# Patient Record
Sex: MALE | Race: WHITE | NOT HISPANIC OR LATINO | Employment: STUDENT | URBAN - METROPOLITAN AREA
[De-identification: names, ages, dates, MRNs, and addresses within clinical notes are randomized per-mention and may not be internally consistent; named-entity substitution may affect disease eponyms.]

---
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
## 2024-10-31 ENCOUNTER — HOSPITAL ENCOUNTER (EMERGENCY)
Facility: HOSPITAL | Age: 19
Discharge: HOME | End: 2024-10-31
Attending: EMERGENCY MEDICINE
Payer: COMMERCIAL

## 2024-10-31 VITALS
DIASTOLIC BLOOD PRESSURE: 73 MMHG | WEIGHT: 225 LBS | OXYGEN SATURATION: 99 % | HEART RATE: 72 BPM | SYSTOLIC BLOOD PRESSURE: 150 MMHG | TEMPERATURE: 98.7 F | RESPIRATION RATE: 17 BRPM

## 2024-10-31 DIAGNOSIS — L23.7 POISON IVY DERMATITIS: ICD-10-CM

## 2024-10-31 DIAGNOSIS — R21 RASH: Primary | ICD-10-CM

## 2024-10-31 PROCEDURE — 99282 EMERGENCY DEPT VISIT SF MDM: CPT

## 2024-10-31 PROCEDURE — 99284 EMERGENCY DEPT VISIT MOD MDM: CPT

## 2024-10-31 RX ORDER — PREDNISONE 20 MG/1
TABLET ORAL
Qty: 29 TABLET | Refills: 0 | Status: SHIPPED | OUTPATIENT
Start: 2024-11-01 | End: 2024-10-31

## 2024-10-31 RX ORDER — PREDNISONE 20 MG/1
60 TABLET ORAL ONCE
Status: COMPLETED | OUTPATIENT
Start: 2024-10-31 | End: 2024-10-31

## 2024-10-31 RX ORDER — PREDNISONE 20 MG/1
TABLET ORAL
Qty: 29 TABLET | Refills: 0 | Status: SHIPPED | OUTPATIENT
Start: 2024-11-01 | End: 2024-11-18

## 2024-10-31 RX ADMIN — PREDNISONE 60 MG: 20 TABLET ORAL at 12:11

## 2024-10-31 NOTE — ED PROVIDER NOTES
Time reflects when diagnosis was documented in both MDM as applicable and the Disposition within this note       Time User Action Codes Description Comment    10/31/2024 12:33 PM Kayy Arshad Add [R21] Rash     10/31/2024 12:33 PM Kayy Arshad Add [L23.7] Poison ivy dermatitis           ED Disposition       ED Disposition   Discharge    Condition   Stable    Date/Time   Thu Oct 31, 2024 12:33 PM    Comment   Weston Ruffsusan discharge to home/self care.                   Assessment & Plan       Medical Decision Making  Patient is a 19-year-old male with a pruritic rash consistent with contact dermatitis, likely due to poison ivy exposure. Differential diagnosis includes, but is not limited to, allergic contact dermatitis, atopic dermatitis, secondary impetigo, etc. History and exam findings not consistent with dangerous etiologies of rash such as SJS/TEN. Rash does not appear urticarial with no signs of anaphylaxis either.    The patient was given first dose of prednisone in the ED and a prescription for a taper was sent to the pharmacy.  Patient was advised to continue oral antihistamine for itching as needed.  Instructions were given to avoid further exposure to the allergen and to keep the affected areas clean and dry.    Follow-up was advised with the primary care provider or ED if the rash does not improve or worsens, or if any signs of infection develop. The treatment plan has been discussed, and the patient has verbalized understanding. The patient was discharged in well-appearing condition.    Risk  Prescription drug management.             Medications   predniSONE tablet 60 mg (60 mg Oral Given 10/31/24 1211)       ED Risk Strat Scores             CRAFFT      Flowsheet Row Most Recent Value   VIRIDIANA Initial Screen: During the past 12 months, did you:    1. Drink any alcohol (more than a few sips)?  No Filed at: 10/31/2024 1104   2. Smoke any marijuana or hashish No Filed at: 10/31/2024 1104   3. Use  "anything else to get high? (\"anything else\" includes illegal drugs, over the counter and prescription drugs, and things that you sniff or 'harris')? No Filed at: 10/31/2024 110                                          History of Present Illness       Chief Complaint   Patient presents with    Rash     Rash on neck that started a week ago and woke up this morning with swelling in his right eye       History reviewed. No pertinent past medical history.   Past Surgical History:   Procedure Laterality Date    TONSILLECTOMY        History reviewed. No pertinent family history.   Social History     Tobacco Use    Smoking status: Never    Smokeless tobacco: Never   Vaping Use    Vaping status: Former   Substance Use Topics    Alcohol use: Not Currently    Drug use: Yes     Types: Marijuana      E-Cigarette/Vaping    E-Cigarette Use Former User       E-Cigarette/Vaping Substances      I have reviewed and agree with the history as documented.     The patient is a 19 year old male who presents with a 4-day history of a rash on the right side of his face, neck, as well as, the lower abdomen and groin. The patient reports the rash is pruritic and denies any drainage, fluctuance, fever or chills. Patient denies any new exposures to creams, lotions, shampoos or other identifiable allergens. The patient reports that he was in the woods 1-2 days prior to the onset of symptoms. He also reports a history of a similar rash previously, which resolved on its own and was less extensive, per patient. The patient has tried diphenhydramine and reports no significant improvement in symptoms. Patient denies recent sexual activity, penile discharge or lesions.      Rash  Associated symptoms: no abdominal pain, no fever, no headaches, no nausea and not vomiting        Review of Systems   Constitutional:  Negative for chills and fever.   HENT:  Negative for facial swelling.    Eyes:  Negative for photophobia, pain, discharge, redness, itching and " visual disturbance.   Gastrointestinal:  Negative for abdominal pain, nausea and vomiting.   Genitourinary:  Negative for dysuria, penile discharge, penile pain, penile swelling, scrotal swelling and testicular pain.   Skin:  Positive for rash.   Neurological:  Negative for headaches.           Objective       ED Triage Vitals   Temperature Pulse Blood Pressure Respirations SpO2 Patient Position - Orthostatic VS   10/31/24 1104 10/31/24 1106 10/31/24 1106 10/31/24 1104 10/31/24 1106 --   97.6 °F (36.4 °C) 69 (!) 171/86 18 100 %       Temp Source Heart Rate Source BP Location FiO2 (%) Pain Score    10/31/24 1235 10/31/24 1235 10/31/24 1235 -- 10/31/24 1104    Oral Monitor Right arm  6      Vitals      Date and Time Temp Pulse SpO2 Resp BP Pain Score FACES Pain Rating User   10/31/24 1235 98.7 °F (37.1 °C) 72 99 % 17 150/73 -- -- JK   10/31/24 1125 -- -- -- -- 145/75 -- -- JLC   10/31/24 1106 -- 69 100 % -- 171/86 -- -- AVA   10/31/24 1104 97.6 °F (36.4 °C) -- -- 18 -- 6 -- AVA            Physical Exam  Vitals and nursing note reviewed. Exam conducted with a chaperone present (Medical student present during evaluation).   Constitutional:       General: He is not in acute distress.     Appearance: Normal appearance. He is well-developed. He is not ill-appearing.   HENT:      Head: Normocephalic and atraumatic.   Eyes:      General:         Right eye: No discharge.         Left eye: No discharge.      Conjunctiva/sclera: Conjunctivae normal.      Pupils: Pupils are equal, round, and reactive to light.      Comments: Np pain with EOMs   Cardiovascular:      Rate and Rhythm: Normal rate.   Pulmonary:      Effort: Pulmonary effort is normal.   Abdominal:      General: There is no distension.   Genitourinary:     Penis: Normal.       Testes: Normal.   Musculoskeletal:         General: No swelling or tenderness.      Cervical back: Neck supple.   Skin:     General: Skin is warm and dry.      Capillary Refill: Capillary refill  takes less than 2 seconds.      Findings: Erythema and rash present.      Comments: Well-defined, erythematous maculopapular rash on the right side of the face and neck, and lower abdomen. No drainage or secondary lesions are observed. There is no tenderness, fluctuance or signs of infection.   Neurological:      Mental Status: He is alert.   Psychiatric:         Mood and Affect: Mood normal.               Results Reviewed       None            No orders to display       Procedures    ED Medication and Procedure Management   None     Discharge Medication List as of 10/31/2024 12:41 PM        START taking these medications    Details   predniSONE 20 mg tablet Multiple Dosages:Starting Fri 11/1/2024, Until Sat 11/2/2024 at 2359, THEN Starting Sun 11/3/2024, Until Tue 11/5/2024 at 2359, THEN Starting Wed 11/6/2024, Until Fri 11/8/2024 at 2359, THEN Starting Sat 11/9/2024, Until Mon 11/11/2024 at 2359, THEN  Starting Tue 11/12/2024, Until Thu 11/14/2024 at 2359, THEN Starting Fri 11/15/2024, Until Sun 11/17/2024 at 2359Take 3 tablets (60 mg total) by mouth daily for 2 days, THEN 2.5 tablets (50 mg total) daily for 3 days, THEN 2 tablets (40 mg total) jayden y for 3 days, THEN 1.5 tablets (30 mg total) daily for 3 days, THEN 1 tablet (20 mg total) daily for 3 days, THEN 0.5 tablets (10 mg total) daily for 3 days. Do not start before November 1, 2024., Normal           No discharge procedures on file.  ED SEPSIS DOCUMENTATION   Time reflects when diagnosis was documented in both MDM as applicable and the Disposition within this note       Time User Action Codes Description Comment    10/31/2024 12:33 PM Kayy Arshad [R21] Rash     10/31/2024 12:33 PM Kayy Arshad [L23.7] Poison ivy dermatitis                  Kayy Arshad PA-C  11/03/24 2018

## 2024-10-31 NOTE — DISCHARGE INSTRUCTIONS
Please take the prednisone taper that has been sent to your pharmacy.  The instructions should be listed below however please clarify with the pharmacist if you are experiencing any problems.    Please return to the emergency department if you are experiencing worsening rash, spreading rash, discharge from the rash, fever, chills, or any new or concerning symptoms.

## 2024-12-26 ENCOUNTER — HOSPITAL ENCOUNTER (EMERGENCY)
Facility: HOSPITAL | Age: 19
Discharge: HOME | End: 2024-12-26
Attending: STUDENT IN AN ORGANIZED HEALTH CARE EDUCATION/TRAINING PROGRAM | Admitting: STUDENT IN AN ORGANIZED HEALTH CARE EDUCATION/TRAINING PROGRAM
Payer: COMMERCIAL

## 2024-12-26 VITALS
SYSTOLIC BLOOD PRESSURE: 131 MMHG | OXYGEN SATURATION: 100 % | HEART RATE: 68 BPM | DIASTOLIC BLOOD PRESSURE: 65 MMHG | RESPIRATION RATE: 18 BRPM | TEMPERATURE: 97.8 F | WEIGHT: 273.4 LBS

## 2024-12-26 DIAGNOSIS — S93.409A ANKLE SPRAIN: Primary | ICD-10-CM

## 2024-12-26 PROCEDURE — 99283 EMERGENCY DEPT VISIT LOW MDM: CPT

## 2024-12-26 PROCEDURE — 99284 EMERGENCY DEPT VISIT MOD MDM: CPT | Performed by: STUDENT IN AN ORGANIZED HEALTH CARE EDUCATION/TRAINING PROGRAM

## 2024-12-26 NOTE — Clinical Note
Weston Salinas was seen and treated in our emergency department on 12/26/2024.    No restrictions            Diagnosis:     Weston  may return to work on return date.    He may return on this date: 12/28/2024         If you have any questions or concerns, please don't hesitate to call.      Sky Whitfield, DO    ______________________________           _______________          _______________  Hospital Representative                              Date                                Time

## 2024-12-26 NOTE — ED NOTES
Pt seen, assessed and d/c by provider. Pt appeared to be in no acute distress upon discharge. Pt able to ambulate well without assistance upon exiting.      Jeimy Cartwright RN  12/26/24 1347

## 2024-12-26 NOTE — DISCHARGE INSTRUCTIONS
As discussed please follow up with orthopedics. Return to the ED for any new or concerning symptoms.

## 2024-12-27 NOTE — ED PROVIDER NOTES
"Time reflects when diagnosis was documented in both MDM as applicable and the Disposition within this note       Time User Action Codes Description Comment    12/26/2024  3:53 PM Sky Whitfield Add [S96.409A] Ankle sprain           ED Disposition       ED Disposition   Discharge    Condition   Stable    Date/Time   u Dec 26, 2024  3:53 PM    Comment   Weston Ruffsusan discharge to home/self care.                   Assessment & Plan       Medical Decision Making  Patient is a 19 y.o. male who presents to the ED for right ankle pain.  Patient is nontoxic, well-appearing.     Differential includes but is not limited to: Sprain/strain.  Doubt fracture.    Plan: Patient does not want x-rays.  Will discharge.  Orthopedic follow-up discussed.  Return precautions discussed.  Patient verbalized understanding and agreed to plan of care.                            Medications - No data to display    ED Risk Strat Scores            CRAFFT      Flowsheet Row Most Recent Value   CRAFFT Initial Screen: During the past 12 months, did you:    1. Drink any alcohol (more than a few sips)?  No Filed at: 12/26/2024 8415   2. Smoke any marijuana or hashish Yes Filed at: 12/26/2024 3596   3. Use anything else to get high? (\"anything else\" includes illegal drugs, over the counter and prescription drugs, and things that you sniff or 'harris')? No Filed at: 12/26/2024 2683                                          History of Present Illness       Chief Complaint   Patient presents with    Ankle Injury     States \" I want to be honest with you, I need a work note \". States he did slip on ice and rolled his right ankle . He will decide if he wants a xray.        History reviewed. No pertinent past medical history.   Past Surgical History:   Procedure Laterality Date    TONSILLECTOMY        History reviewed. No pertinent family history.   Social History     Tobacco Use    Smoking status: Never    Smokeless tobacco: Never   Vaping Use    Vaping " status: Every Day    Substances: Nicotine, THC, CBD, Flavoring   Substance Use Topics    Alcohol use: Not Currently    Drug use: Yes     Types: Marijuana      E-Cigarette/Vaping    E-Cigarette Use Current Every Day User       E-Cigarette/Vaping Substances    Nicotine Yes     THC Yes     CBD Yes     Flavoring Yes     Other No     Unknown No       I have reviewed and agree with the history as documented.     19-year-old male, no pertinent past medical history, who presents the emergency room for right ankle pain, work note.  Patient states he slipped on ice prior to arrival.  Has been able to walk.  However, it hurts to do so.  Requesting work note as he would have trouble performing his duties at work due to the pain.  No other injuries.  Does not want imaging.  No other complaints or concerns.      Ankle Injury      Review of Systems   Musculoskeletal:  Negative for gait problem.   All other systems reviewed and are negative.          Objective       ED Triage Vitals [12/26/24 1533]   Temperature Pulse Blood Pressure Respirations SpO2 Patient Position - Orthostatic VS   97.8 °F (36.6 °C) 68 131/65 18 100 % Sitting      Temp Source Heart Rate Source BP Location FiO2 (%) Pain Score    Tympanic Monitor Left arm -- 4      Vitals      Date and Time Temp Pulse SpO2 Resp BP Pain Score FACES Pain Rating User   12/26/24 1533 97.8 °F (36.6 °C) 68 100 % 18 131/65 4 -- SW            Physical Exam  Vitals and nursing note reviewed.   Constitutional:       General: He is not in acute distress.     Appearance: He is well-developed. He is not ill-appearing, toxic-appearing or diaphoretic.   HENT:      Head: Normocephalic and atraumatic.      Right Ear: External ear normal.      Left Ear: External ear normal.      Nose: Nose normal.   Eyes:      General: Lids are normal. No scleral icterus.  Cardiovascular:      Rate and Rhythm: Normal rate and regular rhythm.   Pulmonary:      Effort: Pulmonary effort is normal. No respiratory  distress.   Musculoskeletal:         General: No deformity. Normal range of motion.      Cervical back: Normal range of motion and neck supple.      Comments: Minimal tenderness to the right lateral malleoli.  No proximal fibula tenderness.  2+ DP PT pulse.  Full range of motion of right ankle without difficulty.  No crepitus   Skin:     General: Skin is warm and dry.   Neurological:      General: No focal deficit present.      Mental Status: He is alert.   Psychiatric:         Mood and Affect: Mood normal.         Behavior: Behavior normal.         Results Reviewed       None            No orders to display       Procedures    ED Medication and Procedure Management   None     There are no discharge medications for this patient.    No discharge procedures on file.  ED SEPSIS DOCUMENTATION   Time reflects when diagnosis was documented in both MDM as applicable and the Disposition within this note       Time User Action Codes Description Comment    12/26/2024  3:53 PM Sky Whitfield Add [S93.409A] Ankle sprain                  Sky Whitfield,   12/27/24 3214

## 2025-03-17 ENCOUNTER — HOSPITAL ENCOUNTER (EMERGENCY)
Facility: HOSPITAL | Age: 20
Discharge: HOME | End: 2025-03-17
Attending: EMERGENCY MEDICINE | Admitting: EMERGENCY MEDICINE
Payer: COMMERCIAL

## 2025-03-17 ENCOUNTER — APPOINTMENT (EMERGENCY)
Dept: RADIOLOGY | Facility: HOSPITAL | Age: 20
End: 2025-03-17
Payer: COMMERCIAL

## 2025-03-17 VITALS
TEMPERATURE: 99 F | RESPIRATION RATE: 18 BRPM | WEIGHT: 250 LBS | DIASTOLIC BLOOD PRESSURE: 69 MMHG | SYSTOLIC BLOOD PRESSURE: 136 MMHG | OXYGEN SATURATION: 100 % | HEART RATE: 73 BPM

## 2025-03-17 DIAGNOSIS — S80.00XA KNEE CONTUSION: Primary | ICD-10-CM

## 2025-03-17 PROCEDURE — 73564 X-RAY EXAM KNEE 4 OR MORE: CPT

## 2025-03-17 PROCEDURE — 99283 EMERGENCY DEPT VISIT LOW MDM: CPT

## 2025-03-17 PROCEDURE — 99284 EMERGENCY DEPT VISIT MOD MDM: CPT | Performed by: EMERGENCY MEDICINE

## 2025-03-17 RX ORDER — ACETAMINOPHEN 325 MG/1
975 TABLET ORAL ONCE
Status: COMPLETED | OUTPATIENT
Start: 2025-03-17 | End: 2025-03-17

## 2025-03-17 RX ORDER — NAPROXEN 500 MG/1
500 TABLET ORAL 2 TIMES DAILY WITH MEALS
Qty: 30 TABLET | Refills: 0 | Status: SHIPPED | OUTPATIENT
Start: 2025-03-17

## 2025-03-17 RX ORDER — DIPHENOXYLATE HYDROCHLORIDE AND ATROPINE SULFATE 2.5; .025 MG/1; MG/1
1 TABLET ORAL DAILY
COMMUNITY

## 2025-03-17 RX ADMIN — ACETAMINOPHEN 975 MG: 325 TABLET ORAL at 00:53

## 2025-03-17 NOTE — DISCHARGE INSTRUCTIONS
No call tomorrow is good news, means radiology saw no fracture, like I saw no fracture    Naproxen and knee immobilizer for a few d    If feeling better can spend more and more time out of it    Ok to bear weight with it.    If still symptomatic after 1 week follow with orthopedics to help evaluate other injuries like occult fracture, ligament or meniscus injuries.
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
## 2025-03-20 DIAGNOSIS — M23.92 ACUTE INTERNAL DERANGEMENT OF LEFT KNEE: Primary | ICD-10-CM

## 2025-03-20 DIAGNOSIS — S80.00XA KNEE CONTUSION: ICD-10-CM

## 2025-03-20 PROCEDURE — 99203 OFFICE O/P NEW LOW 30 MIN: CPT | Performed by: ORTHOPAEDIC SURGERY
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
## 2025-03-20 NOTE — ASSESSMENT & PLAN NOTE
Upon examination today, I am concerned for acute, traumatic left knee internal derangement, including medial meniscus and MCL pathology. The patient demonstrates tenderness along the medial joint line and proximal aspect of the MCL, including the origin site. The patient also demonstrates a large joint effusion, limited knee flexion with pain at end range, and laxity with valgus stress. As such, I recommended an MRI for further evaluation. We will plan to follow up with the patient after the MRI to discuss next treatment steps. In the meantime, the patient can use OTC medications and ice/heat as needed for pain control. We discussed discontinuing the knee immobilizer and transitioned him to a TROM today to provide better support/stability.     Orders:    T-Rom  Post Op Knee Brace    MRI knee left  wo contrast; Future
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
## 2025-04-01 ENCOUNTER — TELEPHONE (OUTPATIENT)
Age: 20
End: 2025-04-01
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
## 2025-04-05 ENCOUNTER — HOSPITAL ENCOUNTER (OUTPATIENT)
Dept: RADIOLOGY | Facility: HOSPITAL | Age: 20
Discharge: HOME | End: 2025-04-05
Payer: COMMERCIAL

## 2025-04-05 DIAGNOSIS — M23.92 ACUTE INTERNAL DERANGEMENT OF LEFT KNEE: ICD-10-CM

## 2025-04-05 PROCEDURE — 73721 MRI JNT OF LWR EXTRE W/O DYE: CPT

## 2025-04-14 ENCOUNTER — TELEPHONE (OUTPATIENT)
Dept: OBGYN CLINIC | Facility: CLINIC | Age: 20
End: 2025-04-14

## 2025-04-14 NOTE — TELEPHONE ENCOUNTER
Lm in regards to not showing up for appt today and asking if they want to reschedule for another day

## 2025-04-24 ENCOUNTER — OFFICE VISIT (OUTPATIENT)
Dept: OBGYN CLINIC | Facility: CLINIC | Age: 20
End: 2025-04-24
Payer: COMMERCIAL

## 2025-04-24 VITALS — WEIGHT: 275 LBS | BODY MASS INDEX: 31.82 KG/M2 | HEIGHT: 78 IN

## 2025-04-24 DIAGNOSIS — S83.412A SPRAIN OF MEDIAL COLLATERAL LIGAMENT OF LEFT KNEE, INITIAL ENCOUNTER: Primary | ICD-10-CM

## 2025-04-24 PROCEDURE — 99213 OFFICE O/P EST LOW 20 MIN: CPT | Performed by: ORTHOPAEDIC SURGERY
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
